# Patient Record
Sex: FEMALE | Race: BLACK OR AFRICAN AMERICAN | URBAN - METROPOLITAN AREA
[De-identification: names, ages, dates, MRNs, and addresses within clinical notes are randomized per-mention and may not be internally consistent; named-entity substitution may affect disease eponyms.]

---

## 2022-12-15 ENCOUNTER — WEB ENCOUNTER (OUTPATIENT)
Dept: URBAN - METROPOLITAN AREA CLINIC 92 | Facility: CLINIC | Age: 72
End: 2022-12-15

## 2022-12-15 ENCOUNTER — OFFICE VISIT (OUTPATIENT)
Dept: URBAN - METROPOLITAN AREA CLINIC 92 | Facility: CLINIC | Age: 72
End: 2022-12-15
Payer: MEDICARE

## 2022-12-15 VITALS
HEART RATE: 59 BPM | WEIGHT: 190 LBS | SYSTOLIC BLOOD PRESSURE: 158 MMHG | TEMPERATURE: 97 F | HEIGHT: 64 IN | BODY MASS INDEX: 32.44 KG/M2 | DIASTOLIC BLOOD PRESSURE: 76 MMHG

## 2022-12-15 DIAGNOSIS — Z86.010 PERSONAL HISTORY OF COLON POLYPS: ICD-10-CM

## 2022-12-15 DIAGNOSIS — K59.00 CONSTIPATION: ICD-10-CM

## 2022-12-15 PROBLEM — 428283002 HISTORY OF POLYP OF COLON: Status: ACTIVE | Noted: 2022-12-15

## 2022-12-15 PROCEDURE — 99204 OFFICE O/P NEW MOD 45 MIN: CPT | Performed by: INTERNAL MEDICINE

## 2022-12-15 RX ORDER — LEVOTHYROXINE SODIUM 150 UG/1
TABLET ORAL
Qty: 0 | Refills: 0 | COMMUNITY
Start: 1900-01-01

## 2022-12-15 RX ORDER — SODIUM SULFATE, POTASSIUM SULFATE, MAGNESIUM SULFATE 1.6; 3.13; 17.5 G/177ML; G/177ML; G/177ML
354 ML SOLUTION ORAL
Qty: 354 ML | Refills: 0 | OUTPATIENT
Start: 2022-12-15 | End: 2022-12-16

## 2022-12-15 RX ORDER — LINACLOTIDE 145 UG/1
1 CAPSULE AT LEAST 30 MINUTES BEFORE THE FIRST MEAL CAPSULE, GELATIN COATED ORAL ONCE A DAY
Qty: 30 | Refills: 2 | OUTPATIENT
Start: 2022-12-15 | End: 2023-03-15

## 2022-12-15 NOTE — PHYSICAL EXAM RECTAL:
normal tone, no external hemorrhoids, no masses palpable, no red blood, Tenderness on NIURKA, Internal hemorrhoids present

## 2022-12-15 NOTE — HPI-TODAY'S VISIT:
This is a 73 yo female  referred by Dr. Annabel Braxton for a colonoscopy.  A copy of this document will be sent to the referring provider. Multiple tubular adenomas were removed 2018.  The patient denies abdominal pain, weight loss, rectal bleeding, diarrhea, and a change in bowel habits. The patient reports constipation for years which has worsened for the past 3 months.  She has to manually disimpact daily.   She reports scoliosis and severe arthritis.  Psyllim and OTC stool softeneres. Miralax and MOM are not effective.

## 2022-12-19 ENCOUNTER — TELEPHONE ENCOUNTER (OUTPATIENT)
Dept: URBAN - METROPOLITAN AREA CLINIC 92 | Facility: CLINIC | Age: 72
End: 2022-12-19

## 2022-12-19 RX ORDER — LEVOTHYROXINE SODIUM 150 UG/1
TABLET ORAL
Qty: 0 | Refills: 0 | COMMUNITY
Start: 1900-01-01

## 2022-12-19 RX ORDER — LINACLOTIDE 145 UG/1
1 CAPSULE AT LEAST 30 MINUTES BEFORE THE FIRST MEAL CAPSULE, GELATIN COATED ORAL ONCE A DAY
Qty: 30 | Refills: 2 | Status: ACTIVE | COMMUNITY
Start: 2022-12-15 | End: 2023-03-15

## 2022-12-19 RX ORDER — LACTULOSE 10 G/15ML
15 ML SOLUTION ORAL TWICE A DAY
Qty: 900 ML | Refills: 6 | OUTPATIENT
Start: 2022-12-21 | End: 2023-07-19

## 2023-02-15 PROBLEM — 14760008 CONSTIPATION: Status: ACTIVE | Noted: 2022-12-15

## 2023-04-12 ENCOUNTER — OFFICE VISIT (OUTPATIENT)
Dept: URBAN - METROPOLITAN AREA SURGERY CENTER 30 | Facility: SURGERY CENTER | Age: 73
End: 2023-04-12
Payer: MEDICARE

## 2023-04-12 DIAGNOSIS — Z86.010 ADENOMAS PERSONAL HISTORY OF COLONIC POLYPS: ICD-10-CM

## 2023-04-12 PROCEDURE — G0105 COLORECTAL SCRN; HI RISK IND: HCPCS | Performed by: INTERNAL MEDICINE

## 2023-04-12 PROCEDURE — G8907 PT DOC NO EVENTS ON DISCHARG: HCPCS | Performed by: INTERNAL MEDICINE

## 2024-03-19 ENCOUNTER — LAB (OUTPATIENT)
Dept: URBAN - METROPOLITAN AREA CLINIC 92 | Facility: CLINIC | Age: 74
End: 2024-03-19

## 2024-03-19 ENCOUNTER — OV EP (OUTPATIENT)
Dept: URBAN - METROPOLITAN AREA CLINIC 92 | Facility: CLINIC | Age: 74
End: 2024-03-19
Payer: MEDICARE

## 2024-03-19 VITALS
DIASTOLIC BLOOD PRESSURE: 73 MMHG | HEIGHT: 64 IN | BODY MASS INDEX: 33.63 KG/M2 | SYSTOLIC BLOOD PRESSURE: 146 MMHG | TEMPERATURE: 97.2 F | WEIGHT: 197 LBS | HEART RATE: 64 BPM

## 2024-03-19 DIAGNOSIS — Z86.010 PERSONAL HISTORY OF COLON POLYPS: ICD-10-CM

## 2024-03-19 DIAGNOSIS — K59.09 OTHER CONSTIPATION: ICD-10-CM

## 2024-03-19 PROCEDURE — 99214 OFFICE O/P EST MOD 30 MIN: CPT | Performed by: PHYSICIAN ASSISTANT

## 2024-03-19 RX ORDER — LEVOTHYROXINE SODIUM 150 UG/1
TABLET ORAL
Qty: 0 | Refills: 0 | Status: ACTIVE | COMMUNITY
Start: 1900-01-01

## 2024-03-19 NOTE — PHYSICAL EXAM CONSTITUTIONAL:
well developed, well nourished , in no acute distress , ambulating w a cane, normal communication ability

## 2024-03-19 NOTE — HPI-TODAY'S VISIT:
This is a 74yo female with a hx of CIC who presents for eval of constipation. Notes 2 years of incompelte evacuation and over the last year has required manual disimpaction daily. Has BMs 2/day, hard, requires straining and with dulcolax and prune juice bowels are more liquid but still needs manual maneuvers. Linzess samples did not help. Lactulose caused an upset stomach. Miralax, MOM, fiber has not helped. She has had 6 vaginal deliveries, all requiring stitches. Hx of urinary leakage s/p bladder tack but still has some urinary urgency.  She has a hx of polyps- Multiple tubular adenomas were removed 2018 but colonoscopy in 2023 only showed diverticulosis.  The patient denies abdominal pain, weight loss, rectal bleeding.  Has a hx of hypothryoidism and recent labs show euthyroid. On hydrocodone daily for OA

## 2024-05-09 ENCOUNTER — OFFICE VISIT (OUTPATIENT)
Dept: URBAN - METROPOLITAN AREA MEDICAL CENTER 28 | Facility: MEDICAL CENTER | Age: 74
End: 2024-05-09

## 2024-10-04 ENCOUNTER — TELEPHONE ENCOUNTER (OUTPATIENT)
Dept: URBAN - METROPOLITAN AREA CLINIC 17 | Facility: CLINIC | Age: 74
End: 2024-10-04

## 2024-10-04 ENCOUNTER — OFFICE VISIT (OUTPATIENT)
Dept: URBAN - METROPOLITAN AREA CLINIC 17 | Facility: CLINIC | Age: 74
End: 2024-10-04
Payer: MEDICARE

## 2024-10-04 ENCOUNTER — DASHBOARD ENCOUNTERS (OUTPATIENT)
Age: 74
End: 2024-10-04

## 2024-10-04 VITALS
DIASTOLIC BLOOD PRESSURE: 73 MMHG | SYSTOLIC BLOOD PRESSURE: 144 MMHG | HEART RATE: 54 BPM | WEIGHT: 195 LBS | HEIGHT: 64 IN | BODY MASS INDEX: 33.29 KG/M2 | TEMPERATURE: 97.3 F

## 2024-10-04 DIAGNOSIS — K59.00 CONSTIPATION: ICD-10-CM

## 2024-10-04 PROCEDURE — 99213 OFFICE O/P EST LOW 20 MIN: CPT

## 2024-10-04 RX ORDER — LEVOTHYROXINE SODIUM 150 UG/1
TABLET ORAL
Qty: 0 | Refills: 0 | Status: ACTIVE | COMMUNITY
Start: 1900-01-01

## 2024-10-04 NOTE — HPI-TODAY'S VISIT:
Here for a follow-up regarding constipation. Having incomplete evacuation requiring manual disimpaction.  Having daily BMs 2-3 daily of mostly liquid stools. Using prune juice and OTC stool softeners.  No rectal bleeding or rectal pain.  Upon review of recent OV, she has had 6 vaginal deliveries, all requiring stitches. Hx of urinary leakage s/p bladder tuck but still has some urinary urgency.  She saw Ruchi Whitley PA-C for this. Had an order placed for MR defecography and ARM. She states she canceled both procedures. Had a hip surgery 04/01/2024 and wanted to heal first.

## 2024-10-18 ENCOUNTER — OFFICE VISIT (OUTPATIENT)
Dept: URBAN - METROPOLITAN AREA MEDICAL CENTER 28 | Facility: MEDICAL CENTER | Age: 74
End: 2024-10-18
Payer: MEDICARE

## 2024-10-18 DIAGNOSIS — K59.09 CONSTIPATION: ICD-10-CM

## 2024-10-18 PROCEDURE — 91122 ANAL PRESSURE RECORD: CPT | Performed by: INTERNAL MEDICINE

## 2024-10-18 PROCEDURE — 91120 RECTAL SENSATION TEST: CPT | Performed by: INTERNAL MEDICINE

## 2024-10-22 ENCOUNTER — TELEPHONE ENCOUNTER (OUTPATIENT)
Dept: URBAN - METROPOLITAN AREA CLINIC 92 | Facility: CLINIC | Age: 74
End: 2024-10-22

## 2024-11-01 ENCOUNTER — OFFICE VISIT (OUTPATIENT)
Dept: URBAN - METROPOLITAN AREA CLINIC 17 | Facility: CLINIC | Age: 74
End: 2024-11-01
Payer: MEDICARE

## 2024-11-01 VITALS
BODY MASS INDEX: 33.77 KG/M2 | HEIGHT: 64 IN | HEART RATE: 59 BPM | WEIGHT: 197.8 LBS | SYSTOLIC BLOOD PRESSURE: 159 MMHG | DIASTOLIC BLOOD PRESSURE: 82 MMHG | TEMPERATURE: 97 F

## 2024-11-01 DIAGNOSIS — K59.00 CONSTIPATION: ICD-10-CM

## 2024-11-01 DIAGNOSIS — M62.89 PELVIC FLOOR DYSFUNCTION: ICD-10-CM

## 2024-11-01 PROCEDURE — 99213 OFFICE O/P EST LOW 20 MIN: CPT

## 2024-11-01 RX ORDER — LEVOTHYROXINE SODIUM 150 UG/1
TABLET ORAL
Qty: 0 | Refills: 0 | Status: ACTIVE | COMMUNITY
Start: 1900-01-01

## 2024-11-01 NOTE — HPI-TODAY'S VISIT:
10/04/2024 Here for a follow-up regarding constipation. Having incomplete evacuation requiring manual disimpaction.  Having daily BMs 2-3 daily of mostly liquid stools. Using prune juice and OTC stool softeners.  No rectal bleeding or rectal pain.  Upon review of recent OV, she has had 6 vaginal deliveries, all requiring stitches. Hx of urinary leakage s/p bladder tuck but still has some urinary urgency.  She saw Ruchi Whitley PA-C for this. Had an order placed for MR defecography and ARM. She states she canceled both procedures. Had a hip surgery 04/01/2024 and wanted to heal first.  11/01/24 Here for a follow up. Discussed ARM results.  Drinking prune juice, laxative, suppositories and metamucil for her bowel regimen. States this is too much for her to do daily.  Her baseline without this regimen was once a week.  Linzess 290 mcg did not help. Lactulose did not help.

## 2024-11-01 NOTE — PHYSICAL EXAM CONSTITUTIONAL:
well developed, well nourished , in no acute distress , ambulating without difficulty , normal communication ability 13-Jan-2017 22:07

## 2024-11-07 ENCOUNTER — TELEPHONE ENCOUNTER (OUTPATIENT)
Dept: URBAN - METROPOLITAN AREA CLINIC 105 | Facility: CLINIC | Age: 74
End: 2024-11-07

## 2024-11-07 RX ORDER — LUBIPROSTONE 24 UG/1
1 CAPSULE WITH FOOD AND WATER CAPSULE, GELATIN COATED ORAL TWICE A DAY
Qty: 180 CAPSULE | Refills: 1 | OUTPATIENT
Start: 2024-11-11 | End: 2025-05-10

## 2025-02-03 ENCOUNTER — OFFICE VISIT (OUTPATIENT)
Dept: URBAN - METROPOLITAN AREA CLINIC 17 | Facility: CLINIC | Age: 75
End: 2025-02-03